# Patient Record
Sex: MALE | Race: BLACK OR AFRICAN AMERICAN | NOT HISPANIC OR LATINO | Employment: FULL TIME | URBAN - METROPOLITAN AREA
[De-identification: names, ages, dates, MRNs, and addresses within clinical notes are randomized per-mention and may not be internally consistent; named-entity substitution may affect disease eponyms.]

---

## 2018-06-20 ENCOUNTER — OFFICE VISIT (OUTPATIENT)
Dept: INTERNAL MEDICINE | Facility: CLINIC | Age: 23
End: 2018-06-20
Payer: COMMERCIAL

## 2018-06-20 VITALS
WEIGHT: 174.38 LBS | HEART RATE: 72 BPM | TEMPERATURE: 97 F | HEIGHT: 69 IN | SYSTOLIC BLOOD PRESSURE: 128 MMHG | DIASTOLIC BLOOD PRESSURE: 76 MMHG | BODY MASS INDEX: 25.83 KG/M2

## 2018-06-20 DIAGNOSIS — Z00.00 ANNUAL PHYSICAL EXAM: Primary | ICD-10-CM

## 2018-06-20 DIAGNOSIS — Z11.3 SCREENING EXAMINATION FOR STD (SEXUALLY TRANSMITTED DISEASE): ICD-10-CM

## 2018-06-20 DIAGNOSIS — L91.8 SKIN TAG: ICD-10-CM

## 2018-06-20 PROCEDURE — 99395 PREV VISIT EST AGE 18-39: CPT | Mod: S$GLB,,, | Performed by: FAMILY MEDICINE

## 2018-06-20 PROCEDURE — 99999 PR PBB SHADOW E&M-EST. PATIENT-LVL III: CPT | Mod: PBBFAC,,, | Performed by: FAMILY MEDICINE

## 2018-06-20 NOTE — PROGRESS NOTES
"Subjective:      Patient ID: Raffi Paredes is a 22 y.o. male.    Chief Complaint: Annual Exam    HPI  23 yo male here to establish care and get general exam.  He is healthy, had asthma as a kid.  Sister  at age 16 of Jorge's Domenico syndrome after taking Bactrim.  He wants that on his allergy list.  His dad's whole family is allergic.  He has a few skin tags on his lower abdomen, he would like to try and get those removed.  Sexually active/2 partners.  Using condoms.  Desires STD screening/asymptomatic    Past Medical History:   Diagnosis Date    Asthma      Family History   Problem Relation Age of Onset    Hypertension Mother     Early death Sister 16    Anglin-Domenico syndrome Sister         Bactrim    Diabetes Paternal Grandmother     Hyperlipidemia Neg Hx     Heart disease Neg Hx     Stroke Neg Hx     Colon cancer Neg Hx     Prostate cancer Neg Hx      History reviewed. No pertinent surgical history.  Social History   Substance Use Topics    Smoking status: Former Smoker     Packs/day: 0.50     Years: 3.00     Types: Cigarettes     Quit date:     Smokeless tobacco: Never Used    Alcohol use Yes      Comment: occassional       /76   Pulse 72   Temp 97.4 °F (36.3 °C) (Tympanic)   Ht 5' 8.5" (1.74 m)   Wt 79.1 kg (174 lb 6.1 oz)   BMI 26.13 kg/m²     Review of Systems   Constitutional: Negative for activity change, appetite change, chills, diaphoresis, fatigue, fever and unexpected weight change.   HENT: Negative for hearing loss and tinnitus.    Eyes: Negative for visual disturbance.   Respiratory: Negative for cough, chest tightness, shortness of breath and wheezing.    Cardiovascular: Negative for chest pain, palpitations and leg swelling.   Gastrointestinal: Negative for abdominal distention, abdominal pain, blood in stool, constipation and rectal pain.   Genitourinary: Negative for difficulty urinating, discharge and testicular pain.   Musculoskeletal: Negative for " arthralgias and back pain.   Neurological: Negative for dizziness, weakness and headaches.       Objective:     Physical Exam   Constitutional: He is oriented to person, place, and time. He appears well-developed and well-nourished. No distress.   HENT:   Right Ear: External ear normal.   Left Ear: External ear normal.   Nose: Nose normal.   Mouth/Throat: Oropharynx is clear and moist.   Eyes: Conjunctivae are normal. Pupils are equal, round, and reactive to light.   Neck: Normal range of motion. Neck supple. No thyromegaly present.   Cardiovascular: Normal rate, regular rhythm and normal heart sounds.    Pulmonary/Chest: Effort normal and breath sounds normal. No respiratory distress. He has no wheezes.   Abdominal: Soft. Bowel sounds are normal. He exhibits no distension. There is no tenderness. There is no guarding.   Musculoskeletal: He exhibits no edema.   Neurological: He is alert and oriented to person, place, and time. No cranial nerve deficit.   Skin: Skin is warm and dry. No rash noted. He is not diaphoretic.        2 skin tags on abdomen/one below the umbilicus with a broad base.   Psychiatric: He has a normal mood and affect. His behavior is normal. Judgment and thought content normal.   Nursing note and vitals reviewed.      Lab Results   Component Value Date    HGB 12.8 02/04/2006    HCT 39.1 02/04/2006    CHOL 188 (H) 02/04/2006    TRIG 111 (H) 02/04/2006    HDL 42.0 (L) 02/04/2006    TSH 4.1 02/04/2006    GLUF 96 02/04/2006       Assessment:     1. Annual physical exam    2. Screening examination for STD (sexually transmitted disease)         Plan:     Annual physical exam  -     Comprehensive metabolic panel; Future; Expected date: 06/27/2018  -     Lipid panel; Future; Expected date: 06/27/2018    Screening examination for STD (sexually transmitted disease)  -     C. trachomatis/N. gonorrhoeae by AMP DNA Urine; Future; Expected date: 06/27/2018  -     Hepatitis panel, acute; Future; Expected date:  06/27/2018  -     Herpes simplex type 1&2 IgG,Herpes titer; Future; Expected date: 06/27/2018  -     HIV-1 and HIV-2 antibodies; Future; Expected date: 06/27/2018  -     RPR; Future; Expected date: 06/27/2018    update annual labs and do STD screening  Avoid Sulfa drugs completely  Stop marijuana  Healthy diet/exercise  Fu annually and PRN

## 2018-06-20 NOTE — PROCEDURES
Cryotherapy  Date/Time: 6/20/2018 1:05 PM  Performed by: MAROC ENGEL  Authorized by: MARCO ENGEL     Consent Done?:  Yes (Verbal)    Indications:  Skin tag     3 applications of liquid nitrogen applied to both skin tags  Pt aware to expect some blistering.  Keep clean/dry.  May require further treatment if not responding to the cryotherapy.

## 2018-06-27 ENCOUNTER — LAB VISIT (OUTPATIENT)
Dept: LAB | Facility: HOSPITAL | Age: 23
End: 2018-06-27
Attending: FAMILY MEDICINE
Payer: COMMERCIAL

## 2018-06-27 DIAGNOSIS — Z11.3 SCREENING EXAMINATION FOR STD (SEXUALLY TRANSMITTED DISEASE): ICD-10-CM

## 2018-06-27 DIAGNOSIS — Z00.00 ANNUAL PHYSICAL EXAM: ICD-10-CM

## 2018-06-27 LAB
ALBUMIN SERPL BCP-MCNC: 4.2 G/DL
ALP SERPL-CCNC: 47 U/L
ALT SERPL W/O P-5'-P-CCNC: 7 U/L
ANION GAP SERPL CALC-SCNC: 7 MMOL/L
AST SERPL-CCNC: 15 U/L
BILIRUB SERPL-MCNC: 0.7 MG/DL
BUN SERPL-MCNC: 8 MG/DL
CALCIUM SERPL-MCNC: 9.6 MG/DL
CHLORIDE SERPL-SCNC: 105 MMOL/L
CHOLEST SERPL-MCNC: 158 MG/DL
CHOLEST/HDLC SERPL: 3.2 {RATIO}
CO2 SERPL-SCNC: 26 MMOL/L
CREAT SERPL-MCNC: 0.8 MG/DL
EST. GFR  (AFRICAN AMERICAN): >60 ML/MIN/1.73 M^2
EST. GFR  (NON AFRICAN AMERICAN): >60 ML/MIN/1.73 M^2
GLUCOSE SERPL-MCNC: 95 MG/DL
HDLC SERPL-MCNC: 50 MG/DL
HDLC SERPL: 31.6 %
LDLC SERPL CALC-MCNC: 92.6 MG/DL
NONHDLC SERPL-MCNC: 108 MG/DL
POTASSIUM SERPL-SCNC: 3.6 MMOL/L
PROT SERPL-MCNC: 7.4 G/DL
SODIUM SERPL-SCNC: 138 MMOL/L
TRIGL SERPL-MCNC: 77 MG/DL

## 2018-06-27 PROCEDURE — 86703 HIV-1/HIV-2 1 RESULT ANTBDY: CPT

## 2018-06-27 PROCEDURE — 36415 COLL VENOUS BLD VENIPUNCTURE: CPT | Mod: PO

## 2018-06-27 PROCEDURE — 80061 LIPID PANEL: CPT

## 2018-06-27 PROCEDURE — 86696 HERPES SIMPLEX TYPE 2 TEST: CPT

## 2018-06-27 PROCEDURE — 86592 SYPHILIS TEST NON-TREP QUAL: CPT

## 2018-06-27 PROCEDURE — 80074 ACUTE HEPATITIS PANEL: CPT

## 2018-06-27 PROCEDURE — 80053 COMPREHEN METABOLIC PANEL: CPT

## 2018-06-28 LAB
HAV IGM SERPL QL IA: NEGATIVE
HBV CORE IGM SERPL QL IA: NEGATIVE
HBV SURFACE AG SERPL QL IA: NEGATIVE
HCV AB SERPL QL IA: NEGATIVE
HIV 1+2 AB+HIV1 P24 AG SERPL QL IA: NEGATIVE
RPR SER QL: NORMAL

## 2018-06-29 ENCOUNTER — TELEPHONE (OUTPATIENT)
Dept: INTERNAL MEDICINE | Facility: CLINIC | Age: 23
End: 2018-06-29

## 2018-06-29 DIAGNOSIS — A74.9 CHLAMYDIA INFECTION: Primary | ICD-10-CM

## 2018-06-29 LAB
HSV1 IGG SERPL QL IA: POSITIVE
HSV2 IGG SERPL QL IA: POSITIVE

## 2018-06-29 RX ORDER — AZITHROMYCIN 500 MG/1
1000 TABLET, FILM COATED ORAL ONCE
Qty: 2 TABLET | Refills: 0 | Status: SHIPPED | OUTPATIENT
Start: 2018-06-29 | End: 2018-06-29

## 2018-06-29 NOTE — TELEPHONE ENCOUNTER
I don't know why it won't allow it.  If unable to schedule, may have to just put on nurse schedule and he can come here and we can do order then.

## 2018-06-29 NOTE — TELEPHONE ENCOUNTER
STD work up so far, shows positive chlamydia screen.  I am sending in Abx for this.  He needs to inform partner/partners and they need to be treated.  He should abstain from sexual encounters until he gets retested to make sure it has cleared.  Test in for 4 wks from now.  Normal sugar/cholesterol.

## 2018-06-29 NOTE — TELEPHONE ENCOUNTER
I tried to schedule from the order and it is placing it as a lab/blood test, won't let me schedule.  Won't let me link as urine test/ lab specimen

## 2018-06-29 NOTE — TELEPHONE ENCOUNTER
Called pt and let know that Dr. Bryan said STD work up so far, shows positive chlamydia screen.  I am sending in Abx for this.  He needs to inform partner/partners and they need to be treated.  He should abstain from sexual encounters until he gets retested to make sure it has cleared.  Test in for 4 wks from now.  Normal sugar/cholesterol    Did you need to see him in 4 weeks or just retest the urine?

## 2018-07-10 ENCOUNTER — LAB VISIT (OUTPATIENT)
Dept: LAB | Facility: HOSPITAL | Age: 23
End: 2018-07-10
Attending: FAMILY MEDICINE
Payer: COMMERCIAL

## 2018-07-10 ENCOUNTER — OFFICE VISIT (OUTPATIENT)
Dept: INTERNAL MEDICINE | Facility: CLINIC | Age: 23
End: 2018-07-10
Payer: COMMERCIAL

## 2018-07-10 VITALS
HEART RATE: 70 BPM | BODY MASS INDEX: 26.32 KG/M2 | SYSTOLIC BLOOD PRESSURE: 138 MMHG | HEIGHT: 69 IN | TEMPERATURE: 98 F | WEIGHT: 177.69 LBS | DIASTOLIC BLOOD PRESSURE: 62 MMHG

## 2018-07-10 DIAGNOSIS — A74.9 CHLAMYDIA INFECTION: ICD-10-CM

## 2018-07-10 DIAGNOSIS — B00.9 HSV-2 (HERPES SIMPLEX VIRUS 2) INFECTION: ICD-10-CM

## 2018-07-10 DIAGNOSIS — A74.9 CHLAMYDIA INFECTION: Primary | ICD-10-CM

## 2018-07-10 PROCEDURE — 3008F BODY MASS INDEX DOCD: CPT | Mod: CPTII,S$GLB,, | Performed by: FAMILY MEDICINE

## 2018-07-10 PROCEDURE — 99999 PR PBB SHADOW E&M-EST. PATIENT-LVL III: CPT | Mod: PBBFAC,,, | Performed by: FAMILY MEDICINE

## 2018-07-10 PROCEDURE — 99213 OFFICE O/P EST LOW 20 MIN: CPT | Mod: S$GLB,,, | Performed by: FAMILY MEDICINE

## 2018-07-10 PROCEDURE — 87491 CHLMYD TRACH DNA AMP PROBE: CPT

## 2018-07-10 NOTE — PROGRESS NOTES
"Subjective:      Patient ID: Raffi Paredes is a 22 y.o. male.    Chief Complaint: Follow-up (lab results)    HPI  23 yo male here for f/u on Chlamydia test and STD screening.  Took azithromycin.  Informed his partner he reports.  Not in relationship right now.  Not having sex.  Does report prior herpes outbreak, he didn't know what they were but he would get a cluster of vesicles that would hurt for a week or so.    Past Medical History:   Diagnosis Date    Asthma      Family History   Problem Relation Age of Onset    Hypertension Mother     Early death Sister 16    Anglin-Domenico syndrome Sister         Bactrim    Diabetes Paternal Grandmother     Hyperlipidemia Neg Hx     Heart disease Neg Hx     Stroke Neg Hx     Colon cancer Neg Hx     Prostate cancer Neg Hx      History reviewed. No pertinent surgical history.  Social History   Substance Use Topics    Smoking status: Former Smoker     Packs/day: 0.50     Years: 3.00     Types: Cigarettes     Quit date: 2016    Smokeless tobacco: Never Used    Alcohol use Yes      Comment: occassional       /62 (BP Location: Right arm, Patient Position: Sitting, BP Method: X-Large (Manual))   Pulse 70   Temp 98.2 °F (36.8 °C) (Tympanic)   Ht 5' 8.5" (1.74 m)   Wt 80.6 kg (177 lb 11.1 oz)   BMI 26.62 kg/m²     Review of Systems   Genitourinary: Negative for discharge, penile pain, penile swelling, scrotal swelling and testicular pain.       Objective:     Physical Exam   Constitutional: He appears well-developed and well-nourished.   Nursing note and vitals reviewed.      Lab Results   Component Value Date    HGB 12.8 02/04/2006    HCT 39.1 02/04/2006    CHOL 158 06/27/2018    TRIG 77 06/27/2018    HDL 50 06/27/2018    ALT 7 (L) 06/27/2018    AST 15 06/27/2018     06/27/2018    K 3.6 06/27/2018     06/27/2018    CREATININE 0.8 06/27/2018    BUN 8 06/27/2018    CO2 26 06/27/2018    TSH 4.1 02/04/2006    GLUF 96 02/04/2006 "       Assessment:     1. Chlamydia infection    2. HSV-2 (herpes simplex virus 2) infection         Plan:     Chlamydia infection    HSV-2 (herpes simplex virus 2) infection    Discussed results  If outbreak should occur, call MD  Monitor for number of outbreaks  Counseling on safe sex  Test of cure for chlamydia today  F/u PRN

## 2018-07-11 LAB
C TRACH DNA SPEC QL NAA+PROBE: NOT DETECTED
N GONORRHOEA DNA SPEC QL NAA+PROBE: NOT DETECTED